# Patient Record
Sex: MALE | Race: BLACK OR AFRICAN AMERICAN | NOT HISPANIC OR LATINO | Employment: UNEMPLOYED | URBAN - METROPOLITAN AREA
[De-identification: names, ages, dates, MRNs, and addresses within clinical notes are randomized per-mention and may not be internally consistent; named-entity substitution may affect disease eponyms.]

---

## 2023-03-28 ENCOUNTER — APPOINTMENT (OUTPATIENT)
Dept: MRI IMAGING | Facility: CLINIC | Age: 43
End: 2023-03-28
Attending: STUDENT IN AN ORGANIZED HEALTH CARE EDUCATION/TRAINING PROGRAM

## 2023-03-28 ENCOUNTER — HOSPITAL ENCOUNTER (EMERGENCY)
Facility: CLINIC | Age: 43
Discharge: HOME | End: 2023-03-28
Attending: STUDENT IN AN ORGANIZED HEALTH CARE EDUCATION/TRAINING PROGRAM | Admitting: STUDENT IN AN ORGANIZED HEALTH CARE EDUCATION/TRAINING PROGRAM

## 2023-03-28 VITALS
TEMPERATURE: 96.5 F | RESPIRATION RATE: 20 BRPM | SYSTOLIC BLOOD PRESSURE: 117 MMHG | DIASTOLIC BLOOD PRESSURE: 58 MMHG | OXYGEN SATURATION: 100 % | HEART RATE: 75 BPM

## 2023-03-28 DIAGNOSIS — R51.9 NONINTRACTABLE HEADACHE, UNSPECIFIED CHRONICITY PATTERN, UNSPECIFIED HEADACHE TYPE: ICD-10-CM

## 2023-03-28 DIAGNOSIS — E04.1 THYROID NODULE: ICD-10-CM

## 2023-03-28 DIAGNOSIS — R42 VERTIGO: ICD-10-CM

## 2023-03-28 LAB
ALBUMIN SERPL BCG-MCNC: 4.4 G/DL (ref 3.5–5.2)
ALP SERPL-CCNC: 69 U/L (ref 40–129)
ALT SERPL W P-5'-P-CCNC: 33 U/L (ref 10–50)
ANION GAP SERPL CALCULATED.3IONS-SCNC: 11 MMOL/L (ref 7–15)
AST SERPL W P-5'-P-CCNC: 20 U/L (ref 10–50)
ATRIAL RATE - MUSE: 84 BPM
BASOPHILS # BLD AUTO: 0.1 10E3/UL (ref 0–0.2)
BASOPHILS NFR BLD AUTO: 1 %
BILIRUB SERPL-MCNC: 0.6 MG/DL
BUN SERPL-MCNC: 12.3 MG/DL (ref 6–20)
CALCIUM SERPL-MCNC: 9.7 MG/DL (ref 8.6–10)
CHLORIDE SERPL-SCNC: 100 MMOL/L (ref 98–107)
CREAT SERPL-MCNC: 1.2 MG/DL (ref 0.67–1.17)
DEPRECATED HCO3 PLAS-SCNC: 26 MMOL/L (ref 22–29)
DIASTOLIC BLOOD PRESSURE - MUSE: NORMAL MMHG
EOSINOPHIL # BLD AUTO: 0.1 10E3/UL (ref 0–0.7)
EOSINOPHIL NFR BLD AUTO: 1 %
ERYTHROCYTE [DISTWIDTH] IN BLOOD BY AUTOMATED COUNT: 11.4 % (ref 10–15)
FLUAV RNA SPEC QL NAA+PROBE: NEGATIVE
FLUBV RNA RESP QL NAA+PROBE: NEGATIVE
GFR SERPL CREATININE-BSD FRML MDRD: 78 ML/MIN/1.73M2
GLUCOSE SERPL-MCNC: 101 MG/DL (ref 70–99)
HCT VFR BLD AUTO: 40.1 % (ref 40–53)
HGB BLD-MCNC: 12.9 G/DL (ref 13.3–17.7)
IMM GRANULOCYTES # BLD: 0 10E3/UL
IMM GRANULOCYTES NFR BLD: 0 %
INTERPRETATION ECG - MUSE: NORMAL
LYMPHOCYTES # BLD AUTO: 1.9 10E3/UL (ref 0.8–5.3)
LYMPHOCYTES NFR BLD AUTO: 36 %
MCH RBC QN AUTO: 29.7 PG (ref 26.5–33)
MCHC RBC AUTO-ENTMCNC: 32.2 G/DL (ref 31.5–36.5)
MCV RBC AUTO: 92 FL (ref 78–100)
MONOCYTES # BLD AUTO: 0.3 10E3/UL (ref 0–1.3)
MONOCYTES NFR BLD AUTO: 5 %
NEUTROPHILS # BLD AUTO: 3 10E3/UL (ref 1.6–8.3)
NEUTROPHILS NFR BLD AUTO: 57 %
NRBC # BLD AUTO: 0 10E3/UL
NRBC BLD AUTO-RTO: 0 /100
P AXIS - MUSE: 41 DEGREES
PLATELET # BLD AUTO: 288 10E3/UL (ref 150–450)
POTASSIUM SERPL-SCNC: 3.9 MMOL/L (ref 3.4–5.3)
PR INTERVAL - MUSE: 154 MS
PROT SERPL-MCNC: 7.9 G/DL (ref 6.4–8.3)
QRS DURATION - MUSE: 88 MS
QT - MUSE: 342 MS
QTC - MUSE: 404 MS
R AXIS - MUSE: 27 DEGREES
RBC # BLD AUTO: 4.34 10E6/UL (ref 4.4–5.9)
RSV RNA SPEC NAA+PROBE: NEGATIVE
SARS-COV-2 RNA RESP QL NAA+PROBE: NEGATIVE
SODIUM SERPL-SCNC: 137 MMOL/L (ref 136–145)
SYSTOLIC BLOOD PRESSURE - MUSE: NORMAL MMHG
T AXIS - MUSE: 28 DEGREES
VENTRICULAR RATE- MUSE: 84 BPM
WBC # BLD AUTO: 5.3 10E3/UL (ref 4–11)

## 2023-03-28 PROCEDURE — 96375 TX/PRO/DX INJ NEW DRUG ADDON: CPT

## 2023-03-28 PROCEDURE — 87637 SARSCOV2&INF A&B&RSV AMP PRB: CPT | Performed by: EMERGENCY MEDICINE

## 2023-03-28 PROCEDURE — 36415 COLL VENOUS BLD VENIPUNCTURE: CPT | Performed by: EMERGENCY MEDICINE

## 2023-03-28 PROCEDURE — 70544 MR ANGIOGRAPHY HEAD W/O DYE: CPT

## 2023-03-28 PROCEDURE — 80053 COMPREHEN METABOLIC PANEL: CPT | Performed by: EMERGENCY MEDICINE

## 2023-03-28 PROCEDURE — 70553 MRI BRAIN STEM W/O & W/DYE: CPT

## 2023-03-28 PROCEDURE — A9585 GADOBUTROL INJECTION: HCPCS | Performed by: STUDENT IN AN ORGANIZED HEALTH CARE EDUCATION/TRAINING PROGRAM

## 2023-03-28 PROCEDURE — 96374 THER/PROPH/DIAG INJ IV PUSH: CPT | Mod: 59

## 2023-03-28 PROCEDURE — 93005 ELECTROCARDIOGRAM TRACING: CPT

## 2023-03-28 PROCEDURE — 250N000011 HC RX IP 250 OP 636: Performed by: STUDENT IN AN ORGANIZED HEALTH CARE EDUCATION/TRAINING PROGRAM

## 2023-03-28 PROCEDURE — 70549 MR ANGIOGRAPH NECK W/O&W/DYE: CPT

## 2023-03-28 PROCEDURE — C9803 HOPD COVID-19 SPEC COLLECT: HCPCS

## 2023-03-28 PROCEDURE — 85025 COMPLETE CBC W/AUTO DIFF WBC: CPT | Performed by: EMERGENCY MEDICINE

## 2023-03-28 PROCEDURE — 99285 EMERGENCY DEPT VISIT HI MDM: CPT | Mod: CS,25

## 2023-03-28 PROCEDURE — 255N000002 HC RX 255 OP 636: Performed by: STUDENT IN AN ORGANIZED HEALTH CARE EDUCATION/TRAINING PROGRAM

## 2023-03-28 RX ORDER — GADOBUTROL 604.72 MG/ML
10 INJECTION INTRAVENOUS ONCE
Status: COMPLETED | OUTPATIENT
Start: 2023-03-28 | End: 2023-03-28

## 2023-03-28 RX ORDER — DIPHENHYDRAMINE HYDROCHLORIDE 50 MG/ML
25 INJECTION INTRAMUSCULAR; INTRAVENOUS ONCE
Status: COMPLETED | OUTPATIENT
Start: 2023-03-28 | End: 2023-03-28

## 2023-03-28 RX ORDER — METOCLOPRAMIDE HYDROCHLORIDE 5 MG/ML
10 INJECTION INTRAMUSCULAR; INTRAVENOUS ONCE
Status: COMPLETED | OUTPATIENT
Start: 2023-03-28 | End: 2023-03-28

## 2023-03-28 RX ORDER — KETOROLAC TROMETHAMINE 15 MG/ML
10 INJECTION, SOLUTION INTRAMUSCULAR; INTRAVENOUS ONCE
Status: COMPLETED | OUTPATIENT
Start: 2023-03-28 | End: 2023-03-28

## 2023-03-28 RX ADMIN — DIPHENHYDRAMINE HYDROCHLORIDE 25 MG: 50 INJECTION, SOLUTION INTRAMUSCULAR; INTRAVENOUS at 20:08

## 2023-03-28 RX ADMIN — KETOROLAC TROMETHAMINE 10 MG: 15 INJECTION, SOLUTION INTRAMUSCULAR; INTRAVENOUS at 20:09

## 2023-03-28 RX ADMIN — METOCLOPRAMIDE HYDROCHLORIDE 10 MG: 5 INJECTION INTRAMUSCULAR; INTRAVENOUS at 20:09

## 2023-03-28 RX ADMIN — GADOBUTROL 10 ML: 604.72 INJECTION INTRAVENOUS at 22:38

## 2023-03-28 ASSESSMENT — ENCOUNTER SYMPTOMS
NAUSEA: 1
HEADACHES: 1
FEVER: 0
DIZZINESS: 1
VOMITING: 0
WEAKNESS: 0
EYE REDNESS: 1
ABDOMINAL PAIN: 0
NUMBNESS: 0

## 2023-03-28 ASSESSMENT — ACTIVITIES OF DAILY LIVING (ADL)
ADLS_ACUITY_SCORE: 33
ADLS_ACUITY_SCORE: 35
ADLS_ACUITY_SCORE: 35

## 2023-03-28 NOTE — ED NOTES
Rapid Assessment Note    History:   Julio César Swanson is a 42 year old male who presents with headache and feeling mild difficulty breathing that started 2 days ago. He has felt lightheaded with one episode of near syncope while driving. He denies recent fever or any cold symptoms. No chest pain.    A Estonian  was used.    Exam:   General:  Alert, interactive  Cardiovascular:  Well perfused  Lungs:  No respiratory distress, no accessory muscle use  Neuro:  Moving all 4 extremities  Skin:  Warm, dry  Psych:  Normal affect      Plan of Care:   I evaluated the patient and developed an initial plan of care. I discussed this plan and explained that I, or one of my partners, would be returning to complete the evaluation.         I, Lidia Jean, am serving as a scribe to document services personally performed by Dr. Friedman, based on my observations and the provider's statements to me.    3/28/2023  [facility]    Portions of this medical record were completed by a scribe. UPON MY REVIEW AND AUTHENTICATION BY ELECTRONIC SIGNATURE, this confirms (a) I performed the applicable clinical services, and (b) the record is accurate.        Leno Friedman MD  03/28/23 [id number]

## 2023-03-28 NOTE — ED PROVIDER NOTES
"  History     Chief Complaint:  Headache       The history is provided by the patient. A  was used (Kyrgyz).      Julio César Swanson is a 42 year old male with a history of hypertension who presents with headache. He reports headache and feeling weak, noting he has not felt well for the past 3 days. The patient notes a cramping headache in his bilateral temples, described as a dull, aching pain throughout his head with throbbing in his temples. He rates his headache 5-6 out of 10 in severity. He has never had a headache like this before and has no history of migraines. He states that his \"head is spinning\" and has a room-spinning sensation that comes and goes. This is worse when he changes positions from sitting to standing, but can be present even when he is sitting. The patient notes near syncope earlier today as well, noting his eyes felt like \"something has been switched off.\" His wife reports that he has been complaining of a tingling sensation in his bilateral hands as well. The patient says this has occurred each morning, and it is not present now. Today this sensation occurred at around 0400 and lasted about 1 hour. The patient notes nausea, but no vomiting. He denies fevers, abdominal pain, and chest pain. No numbness or weakness, and he is able to ambulate. The patient denies any recent head injury. He notes history of hypertension. He takes daily baby aspirin, but no antihypertensives. He has been taking ibuprofen for his headache at home, but did not take this today. The patient also reports concern about a blood vessel popping in the left eye recently.     Independent Historian:   Spouse - The patient's wife supplements history above. She also helped with interpretation.     Review of External Notes: None     ROS:  Review of Systems   Constitutional: Negative for fever.   Eyes: Positive for redness.   Cardiovascular: Negative for chest pain.   Gastrointestinal: Positive for nausea. Negative " for abdominal pain and vomiting.   Musculoskeletal: Negative for gait problem.   Neurological: Positive for dizziness and headaches. Negative for weakness and numbness.        (+) paresthesias    All other systems reviewed and are negative.      Allergies:  No Known Allergies     Medications:   Aspirin     Past Medical History:    Hypertension     Social History:  The patient presents with his wife, Kecia.   He speaks Welsh,  used and wife helped.     Physical Exam     Patient Vitals for the past 24 hrs:   BP Temp Temp src Pulse Resp SpO2   03/28/23 1941 117/58 -- -- 75 -- 100 %   03/28/23 1331 (!) 124/90 (!) 96.5  F (35.8  C) Temporal 83 20 100 %        Physical Exam  Vitals: Reviewed, as above.    General: Alert and oriented, in mild distress. Resting on bed.  Skin: Warm and well-perfused. No rashes, lesions, or erythema.   HEENT:   Head: Normocephalic, atraumatic. Facial features symmetric.   Eyes: Conjunctiva pink, sclera white.   Ears: Auricles without lesion, erythema, or edema.   Nose: Symmetric with no discharge.  Mouth and throat: Lips are moist. Buccal mucosa is pink and moist without lesions. Oropharyngeal mucosa is pink and moist with no erythema, edema, or exudate. Uvula is midline.  Neck: Supple with no lymphadenopathy. Full ROM.   Pulmonary: Chest wall expansion symmetric with no increased work of breathing. Lungs clear to auscultation bilaterally.   Cardiovascular: Heart RRR with no murmurs.  Abdominal: No hernias or distension. Bowel sounds present and physiologic. Abdomen is soft and nontender to light and deep palpation in all 4 quadrants with no guarding or rebound. No masses or organomegaly.   Musculoskeletal: Moves all extremities spontaneously.  Neuro: Patient is alert and oriented to person place time.  Speech fluent with normal cognition.  Cranial nerves II through XII intact:   PERRL, EOMI, symmetric smile, equal eye squeeze and forehead raise, normal sensation in the V1 V2 V3  distribution, grossly equal hearing, midline tongue protrusion with normal side to side movement, full strength with head turn, normal shoulder shrug.  RUE strength 5/5: , elbow flexion/extension, wrist flexion/extension  LUE strength 5/5: , elbow flexion/extension, wrist flexion/extension  RLE strength 5/5: Ankle flexion/extension, knee flexion/extension, hip flexion/extension  LLE strength 5/5: Ankle flexion/extension, knee flexion/extension, hip flexion/extension  No pronator drift, normal rapid alternating movements normal finger-to-nose normal heel-to-shin.   Sensation intact and symmetric. Steady gait.  Psych: Affect appropriate.  Answers questions appropriately. Patient appears calm.       Emergency Department Course     Imaging:  MR Head w/o Contrast Angiogram   Final Result   IMPRESSION:   HEAD MRI:   1.  Motion degraded exam.   2.  Within these limitations, no definite intracranial abnormality identified.      HEAD MRA:   No stenosis/occlusion, aneurysm, or high flow vascular malformation.      NECK MRA:   1.  No measurable stenosis or dissection.   2.  1 cm right thyroid nodule.         MR Brain w/o & w Contrast   Final Result   IMPRESSION:   HEAD MRI:   1.  Motion degraded exam.   2.  Within these limitations, no definite intracranial abnormality identified.      HEAD MRA:   No stenosis/occlusion, aneurysm, or high flow vascular malformation.      NECK MRA:   1.  No measurable stenosis or dissection.   2.  1 cm right thyroid nodule.         MRA Angiogram Neck w/o & w Contrast   Final Result   IMPRESSION:   HEAD MRI:   1.  Motion degraded exam.   2.  Within these limitations, no definite intracranial abnormality identified.      HEAD MRA:   No stenosis/occlusion, aneurysm, or high flow vascular malformation.      NECK MRA:   1.  No measurable stenosis or dissection.   2.  1 cm right thyroid nodule.         Report per radiology    Laboratory:  Labs Ordered and Resulted from Time of ED Arrival to  Time of ED Departure   COMPREHENSIVE METABOLIC PANEL - Abnormal       Result Value    Sodium 137      Potassium 3.9      Chloride 100      Carbon Dioxide (CO2) 26      Anion Gap 11      Urea Nitrogen 12.3      Creatinine 1.20 (*)     Calcium 9.7      Glucose 101 (*)     Alkaline Phosphatase 69      AST 20      ALT 33      Protein Total 7.9      Albumin 4.4      Bilirubin Total 0.6      GFR Estimate 78     CBC WITH PLATELETS AND DIFFERENTIAL - Abnormal    WBC Count 5.3      RBC Count 4.34 (*)     Hemoglobin 12.9 (*)     Hematocrit 40.1      MCV 92      MCH 29.7      MCHC 32.2      RDW 11.4      Platelet Count 288      % Neutrophils 57      % Lymphocytes 36      % Monocytes 5      % Eosinophils 1      % Basophils 1      % Immature Granulocytes 0      NRBCs per 100 WBC 0      Absolute Neutrophils 3.0      Absolute Lymphocytes 1.9      Absolute Monocytes 0.3      Absolute Eosinophils 0.1      Absolute Basophils 0.1      Absolute Immature Granulocytes 0.0      Absolute NRBCs 0.0     INFLUENZA A/B, RSV, & SARS-COV2 PCR - Normal    Influenza A PCR Negative      Influenza B PCR Negative      RSV PCR Negative      SARS CoV2 PCR Negative          Emergency Department Course & Assessments:       Interventions:  Medications   ketorolac (TORADOL) injection 10 mg (10 mg Intravenous $Given 3/28/23 2009)   metoclopramide (REGLAN) injection 10 mg (10 mg Intravenous $Given 3/28/23 2009)   diphenhydrAMINE (BENADRYL) injection 25 mg (25 mg Intravenous $Given 3/28/23 2008)   gadobutrol (GADAVIST) injection 10 mL (10 mLs Intravenous $Given 3/28/23 2238)   sodium chloride (PF) 0.9% PF flush 100 mL (100 mLs Intravenous $Given 3/28/23 2239)        Assessments:  1910 I obtained history and examined the patient as noted above.   2310 I rechecked the patient and explained findings.  He reported headache is resolved.    Independent Interpretation (X-rays, CTs, rhythm strip):  None    Consultations/Discussion of Management or Tests:  None      Social Determinants of Health affecting care:   None    Disposition:  The patient was discharged to home.     Impression & Plan      Medical Decision Making:  Julio César Swanson is a 42 year old male with a history of hypertension who presents with headache.  Please HPI and physical exam for full details per differential diagnosis includes migraine, tension headache, cluster headache, CVA, BPPV, vestibular neuronitis, among others.  Patient has a largely reassuring physical exam with no focal neurologic deficit.  However, he does have a vague sensation of vertigo that is poorly defined.  MRIs are obtained and are negative for acute pathology.  This does reveal a 1 cm thyroid nodule, which the patient was instructed to follow-up with his primary care provider.  His headache resolved with interventions here in the ED, and he reports he feels ready to go home.  He has an appointment to establish care with a new PCP tomorrow, as he recently moved to the area.  Return precautions were discussed.  He is comfortable with the plan.      Diagnosis:    ICD-10-CM    1. Nonintractable headache, unspecified chronicity pattern, unspecified headache type  R51.9       2. Vertigo  R42       3. Thyroid nodule  E04.1     seen on MRI 3/28/2023           Scribe Disclosure:  I, Gisela Ayala, am serving as a scribe at 6:49 PM on 3/28/2023 to document services personally performed by Yoly Meyers PA-C based on my observations and the provider's statements to me.     3/28/2023   Yoly Meyers PA-C Sells, Jenna, PA-C  03/28/23 [id number]